# Patient Record
Sex: MALE | Race: AMERICAN INDIAN OR ALASKA NATIVE
[De-identification: names, ages, dates, MRNs, and addresses within clinical notes are randomized per-mention and may not be internally consistent; named-entity substitution may affect disease eponyms.]

---

## 2018-01-03 ENCOUNTER — HOSPITAL ENCOUNTER (EMERGENCY)
Dept: HOSPITAL 5 - ED | Age: 23
LOS: 1 days | Discharge: LEFT BEFORE DISCHARGE | End: 2018-01-04
Payer: SELF-PAY

## 2018-01-03 VITALS — DIASTOLIC BLOOD PRESSURE: 69 MMHG | SYSTOLIC BLOOD PRESSURE: 133 MMHG

## 2018-01-03 DIAGNOSIS — Z53.21: Primary | ICD-10-CM

## 2021-09-04 ENCOUNTER — HOSPITAL ENCOUNTER (EMERGENCY)
Dept: HOSPITAL 5 - ED | Age: 26
Discharge: HOME | End: 2021-09-04
Payer: SELF-PAY

## 2021-09-04 VITALS — DIASTOLIC BLOOD PRESSURE: 84 MMHG | SYSTOLIC BLOOD PRESSURE: 117 MMHG

## 2021-09-04 DIAGNOSIS — M25.511: Primary | ICD-10-CM

## 2021-09-04 DIAGNOSIS — Z88.8: ICD-10-CM

## 2021-09-04 DIAGNOSIS — Z79.899: ICD-10-CM

## 2021-09-04 PROCEDURE — 99282 EMERGENCY DEPT VISIT SF MDM: CPT

## 2021-09-04 NOTE — EMERGENCY DEPARTMENT REPORT
Upper Extremity





- [facility]


Chief Complaint: Extremity Injury, Upper


Stated Complaint: RT SHOULDER PAIN


Time Seen by Provider: 09/04/21 15:49


Upper Extremity: Right Shoulder


Occurred When: 2 Days


Symptoms: No Pain with Movement, No Deformity, No Limited Range of Movement, No 

Numbness, No Weakness, No Swelling, No Bruising/Ecchymosis, No Laceration or 

Abrasion


Other History: The patient was evaluated in the emergency department for 

symptoms described in the history of present illness.  He/she was evaluated in 

the context of the global COVID-19 pandemic, which necessitated consideration 

that the patient might be at risk for infection with the virus that causes 

COVID-19.  Institutional protocols and algorithms that pertain to the evaluation

of patients at risk for COVID-19 are in a state of rapid change based on 

information released by regulatory bodies including the CDC and federal and 

state organizations.  These policies and algorithms were followed during the pat

ient's care in the emergency department.  Please note that these policies, 

procedures and recommendations changed on a rapid basis.  26-year-old -

American male that is morbid obese presents to the emergency room for 

nontraumatic right shoulder pain.  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 

objects at work.  He denies any injury that he can recall.  Patient is taking 

nothing for his discomfort.  Denies any past medical history no recent falls





ED Review of Systems


ROS: 


Stated complaint: RT SHOULDER PAIN


Other details as noted in HPI








ED Past Medical Hx





- Past Medical History


Previous Medical History?: No





- Surgical History


Past Surgical History?: No





- Social History


Smoking Status: Never Smoker


Substance Use Type: None





- Medications


Home Medications: 


                                Home Medications











 Medication  Instructions  Recorded  Confirmed  Last Taken  Type


 


Naproxen [Naprosyn TAB] 500 mg PO BID #30 tablet 12/12/14  Unknown Rx


 


Diclofenac Sodium 50 mg PO BID PRN #20 tablet. 09/04/21  Unknown Rx














Upper Extremity Exam





- Exam


General: 


Vital signs noted. No distress. Alert and acting appropriately.








ED Course


                                   Vital Signs











  09/04/21





  15:46


 


Temperature 98.7 F


 


Pulse Rate 82


 


Respiratory 18





Rate 


 


Blood Pressure 117/84


 


O2 Sat by Pulse 94





Oximetry 














ED Medical Decision Making





- Medical Decision Making





26-year-old -American male that is morbid obese presents to the emergency

room for nontraumatic right shoulder pain.  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 

pushes objects at work.  He denies any injury that he can recall.  Patient is 

taking nothing for his discomfort.  Denies any past medical history no recent 

falls








Patient has a normal examination.  Discussed with patient take Tylenol ibuprofen

follow-up with primary care provider.


Critical care attestation.: 


If time is entered above; I have spent that time in minutes in the direct care 

of this critically ill patient, excluding procedure time.








ED Disposition


Clinical Impression: 


 Right shoulder pain





Disposition: 01 HOME / SELF CARE / HOMELESS


Is pt being admited?: No


Does the pt Need Aspirin: No


Condition: Stable


Instructions:  Shoulder Pain, Easy-to-Read


Additional Instructions: 


Please take pain medication as needed for pain.  Increase your water and fluid 

intake while taking medication.  If your symptoms persist I recommend following 

up with orthopedic provider.  You can place ice pack on your shoulder will help 

with the pain.


Prescriptions: 


Diclofenac Sodium 50 mg PO BID PRN #20 tablet.dr


 PRN Reason: Pain , Severe (7-10)


Referrals: 


JAMI SPRINGER MD [Staff Physician] - 3-5 Days


Forms:  Work/School Release Form(ED)